# Patient Record
Sex: MALE | Race: WHITE | NOT HISPANIC OR LATINO | URBAN - METROPOLITAN AREA
[De-identification: names, ages, dates, MRNs, and addresses within clinical notes are randomized per-mention and may not be internally consistent; named-entity substitution may affect disease eponyms.]

---

## 2018-08-07 ENCOUNTER — OFFICE (OUTPATIENT)
Dept: URBAN - METROPOLITAN AREA CLINIC 10 | Facility: CLINIC | Age: 78
End: 2018-08-07

## 2018-08-07 VITALS
DIASTOLIC BLOOD PRESSURE: 65 MMHG | SYSTOLIC BLOOD PRESSURE: 132 MMHG | HEART RATE: 59 BPM | HEIGHT: 68 IN | WEIGHT: 206 LBS

## 2018-08-07 DIAGNOSIS — D50.9 IRON DEFICIENCY ANEMIA, UNSPECIFIED: ICD-10-CM

## 2018-08-07 LAB
CBC, PLATELET, NO DIFFERENTIAL: HEMATOCRIT: 44.6 % (ref 37.5–51)
CBC, PLATELET, NO DIFFERENTIAL: HEMOGLOBIN: 13.9 G/DL (ref 13–17.7)
CBC, PLATELET, NO DIFFERENTIAL: MCH: 29.3 PG (ref 26.6–33)
CBC, PLATELET, NO DIFFERENTIAL: MCHC: 31.2 G/DL — LOW (ref 31.5–35.7)
CBC, PLATELET, NO DIFFERENTIAL: MCV: 94 FL (ref 79–97)
CBC, PLATELET, NO DIFFERENTIAL: NRBC: (no result)
CBC, PLATELET, NO DIFFERENTIAL: PLATELETS: (no result) X10E3/UL
CBC, PLATELET, NO DIFFERENTIAL: RBC: 4.74 X10E6/UL (ref 4.14–5.8)
CBC, PLATELET, NO DIFFERENTIAL: RDW: 14.2 % (ref 12.3–15.4)
CBC, PLATELET, NO DIFFERENTIAL: WBC: 15.2 X10E3/UL — HIGH (ref 3.4–10.8)
FE+TIBC+FER: FERRITIN, SERUM: 46 NG/ML (ref 30–400)
FE+TIBC+FER: IRON BIND.CAP.(TIBC): 289 UG/DL (ref 250–450)
FE+TIBC+FER: IRON SATURATION: 25 % (ref 15–55)
FE+TIBC+FER: IRON: 73 UG/DL (ref 38–169)
FE+TIBC+FER: UIBC: 216 UG/DL (ref 111–343)
HEMATOLOGY COMMENTS: (no result)

## 2018-08-07 PROCEDURE — 99204 OFFICE O/P NEW MOD 45 MIN: CPT | Performed by: INTERNAL MEDICINE

## 2018-08-07 NOTE — SERVICEHPINOTES
Mr. Murphy is 78 years old.  He is here for initial visit with me for consideration of screening colonoscopy.  He is a former patient of Dr. Allen.  He has history of CLL and iron deficiency anemia. He denies family history of colon cancer, blood in his stool, or change in bowel habits. He states his anemia has resolved but has had issues with low PLT that he relates is due to the way the test is performed. He is on warfarin for what sounds like afib. 08/2012-colonoscopy by Dr. Olsen for iron deficiency anemia: Normal-EGD by Dr. Olsen for iron deficiency anemia: Normal